# Patient Record
Sex: FEMALE | Race: BLACK OR AFRICAN AMERICAN | NOT HISPANIC OR LATINO | URBAN - METROPOLITAN AREA
[De-identification: names, ages, dates, MRNs, and addresses within clinical notes are randomized per-mention and may not be internally consistent; named-entity substitution may affect disease eponyms.]

---

## 2023-02-21 ENCOUNTER — OFFICE VISIT (OUTPATIENT)
Dept: PLASTIC SURGERY | Facility: CLINIC | Age: 23
End: 2023-02-21

## 2023-02-21 DIAGNOSIS — Z41.1 ENCOUNTER FOR COSMETIC PROCEDURE: Primary | ICD-10-CM

## 2023-02-21 NOTE — NURSING NOTE
Photodocumentation obtained.    Pt given contact info for Dr. Otto Lindo at [facility].    Dee Weiner RN  2/21/2023 1:50 PM

## 2023-02-21 NOTE — PROGRESS NOTES
Facial Plastic and Reconstructive Surgery Cosmetic Consultation    Referring Provider:   Referred Self, MD  No address on file    HPI:   I had the pleasure of seeing Elma Kumar today in clinic for consultation for surgical facial rejuvenation.     Elma Kumar is a 22 year old female. She had a sliding genioplasty in July 2022 with Dr. Anderson. This was done through an intraoral incision. She reports that she is happy with her profile, but feels her face is now too long and her lower lip is too small. She also feels the right chin is slightly rajan than the left. She denies any numbness or tingling. She has done some research and is interested in discussing a V to Y advancement for the lower lip. She has seen her original surgeon and another facial plastic surgeon about these concerns and was referred here. She is also scheduled to see Dr. Sands next month.       PE:  Alert and Oriented, Answering Questions Appropriately  Atraumatic, Normocephalic, Face Symmetric  Facial Nerve Intact and facial movement symmetric  She has appropriate chin projection on lateral view. Her upper and lower lip have a nice proportion. Intraorally, her incision is nicely healed without tethering.             IMPRESSION/PLAN: Elma Kumar is a 22 year old female who is s/p sliding genioplasty last summer who is presenting to discuss additional surgery. She is specifically bothered by feeling like her face is longer, there is more fullness on the right chin, and that her lower lip is smaller than before surgery. I discussed with her that I do not perform bony work on the chin and I gave her some names of [facility] colleagues who she could see. I was candid with her that I do not recommend surgery to the lower lip. We discussed that her lips have a nice proportion and I worry about the variables that surgery can bring including asymmetries, scarring, unacceptable cosmetic results, etc. I did tell her that I do not want to dismiss her  concerns, I just don't think I can meet her expectations. I also urged her to wait a full year before really considering any additional procedure to give her time to fully heal from her original surgery. She will let us know if there is anything additional we can do to help her out.       Photodocumentation was obtained.

## 2025-05-23 ENCOUNTER — HOSPITAL ENCOUNTER (EMERGENCY)
Facility: CLINIC | Age: 25
Discharge: HOME | End: 2025-05-23
Attending: EMERGENCY MEDICINE | Admitting: EMERGENCY MEDICINE
Payer: COMMERCIAL

## 2025-05-23 VITALS
TEMPERATURE: 98.3 F | RESPIRATION RATE: 18 BRPM | BODY MASS INDEX: 29.03 KG/M2 | HEART RATE: 82 BPM | DIASTOLIC BLOOD PRESSURE: 76 MMHG | WEIGHT: 185 LBS | SYSTOLIC BLOOD PRESSURE: 139 MMHG | HEIGHT: 67 IN | OXYGEN SATURATION: 100 %

## 2025-05-23 DIAGNOSIS — S05.02XA ABRASION OF LEFT CORNEA, INITIAL ENCOUNTER: ICD-10-CM

## 2025-05-23 PROCEDURE — 99283 EMERGENCY DEPT VISIT LOW MDM: CPT

## 2025-05-23 RX ORDER — TETRACAINE HYDROCHLORIDE 5 MG/ML
1-2 SOLUTION OPHTHALMIC ONCE
Status: DISCONTINUED | OUTPATIENT
Start: 2025-05-23 | End: 2025-05-23 | Stop reason: HOSPADM

## 2025-05-23 RX ORDER — ERYTHROMYCIN 5 MG/G
0.5 OINTMENT OPHTHALMIC 4 TIMES DAILY
Qty: 3.5 G | Refills: 0 | Status: SHIPPED | OUTPATIENT
Start: 2025-05-23 | End: 2025-05-28

## 2025-05-23 ASSESSMENT — COLUMBIA-SUICIDE SEVERITY RATING SCALE - C-SSRS
2. HAVE YOU ACTUALLY HAD ANY THOUGHTS OF KILLING YOURSELF IN THE PAST MONTH?: NO
6. HAVE YOU EVER DONE ANYTHING, STARTED TO DO ANYTHING, OR PREPARED TO DO ANYTHING TO END YOUR LIFE?: NO
1. IN THE PAST MONTH, HAVE YOU WISHED YOU WERE DEAD OR WISHED YOU COULD GO TO SLEEP AND NOT WAKE UP?: NO

## 2025-05-23 ASSESSMENT — VISUAL ACUITY: OU: 1

## 2025-05-23 ASSESSMENT — ACTIVITIES OF DAILY LIVING (ADL): ADLS_ACUITY_SCORE: 41

## 2025-05-24 NOTE — ED PROVIDER NOTES
"  Emergency Department Encounter     Evaluation Date & Time:   5/23/2025  8:34 PM    CHIEF COMPLAINT:  Eye Pain and Eye Problem      Triage Note:Pt c/o left eye pain and \"floaters\" in the left eye. Pt reports the pain starting yesterday after she had worn some false lashes. Today the pain felt worse. She is attempting to use OTC eye drops without relief      ED COURSE & MEDICAL DECISION MAKING:     Pt here with left eye pain, primarily when she blinks since yesterday around 1600 while driving. Pt was wearing false eyelashes when it happened, suspects she scratched eye.  Still having some pain, primarily with blinking. Using over the counter artificial tears/lubrication, which does help some.  Noticed some floaters today, so she came in.  External, gross eye exam unremarkable.  No HA, no discreet vision loss, no temporal mass/tenderness.  No indication for serum labs. Will administer tetracaine, check pressures and fluorescein exam.  Floaters likely incidental and unrelated, but pt encouraged ophtho follow up no matter what we find on exam.  Otherwise, very reassuring initial evaluation with nothing to suggest retinal detachment, temporal arteritis or orbital cellulitis.      ED Course as of 05/23/25 2311   Fri May 23, 2025   2038 I met the patient and performed my initial interview and exam.    2050 Performed woods lamp and tonopen procedures. Patient discharged with discussion of plan.         Medical Decision Making      Discharge. I prescribed additional prescription strength medication(s) as charted. See documentation for any additional details.    MIPS (CTPE, Dental pain, C[place], Sinusitis, Asthma/COPD, Head Trauma): Not Applicable    SEPSIS: None          MEDICATIONS GIVEN IN THE EMERGENCY DEPARTMENT:  Medications   fluorescein (FUL-SUSANNAH) ophthalmic strip 1 strip (has no administration in time range)   tetracaine (PONTOCAINE) 0.5 % ophthalmic solution 1-2 drop (has no administration in time range)       NEW " "PRESCRIPTIONS STARTED AT TODAY'S ED VISIT:  Discharge Medication List as of 5/23/2025  9:01 PM        START taking these medications    Details   erythromycin (ROMYCIN) 5 MG/GM ophthalmic ointment Place 0.5 inches Into the left eye 4 times daily for 5 days.Disp-3.5 g, [id number]             [facility]     Elma Kumar is a 24 year old female with a 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    Yesterday at 4 PM, the patient was driving when she felt a sudden stabbing pain in her left eye. She pulled over and could visualize no foreign body. She then drove home and noticed that the pain primarily flared when she blinked. She has since taken off the false lashes she wore and started to use eyedrops. But today the pain continued. This evening, she noticed floating black dots in her vision and thus decided to present to the ER for evaluation.     She has no loss of vision or double vision.     REVIEW OF SYSTEMS:  See [facility]      Medical History   History reviewed. No pertinent past medical history.    History reviewed. No pertinent surgical history.    History reviewed. No pertinent family history.         erythromycin (ROMYCIN) 5 MG/GM ophthalmic ointment        Physical Exam     Vitals:  /76   Pulse 82   Temp 98.3  F (36.8  C) (Temporal)   Resp 18   Ht 1.702 m (5' 7\")   Wt 83.9 kg (185 lb)   SpO2 100%   BMI 28.98 kg/m      PHYSICAL EXAM:   Physical Exam  Vitals and nursing note reviewed.   Constitutional:       General: She is not in acute distress.     Appearance: Normal appearance.   HENT:      Head: Normocephalic and atraumatic.      Nose: Nose normal.      Mouth/Throat:      Mouth: Mucous membranes are moist.   Eyes:      General: Lids are normal. Lids are everted, no foreign bodies appreciated. Vision grossly intact.      Extraocular Movements: Extraocular movements intact.      Pupils: Pupils are equal, round, and " reactive to light.      Left eye: Corneal abrasion and fluorescein uptake present.      Slit lamp exam:     Left eye: No foreign body.      Comments: Tonometry left eye: 19, 19  Fluorescein exam with uptake 6 o'clock position left eye.  Negative Sandro, no FB.   Cardiovascular:      Rate and Rhythm: Normal rate and regular rhythm.      Pulses: Normal pulses.           Radial pulses are 2+ on the right side and 2+ on the left side.        Dorsalis pedis pulses are 2+ on the right side and 2+ on the left side.   Pulmonary:      Effort: Pulmonary effort is normal.   Skin:     Findings: No rash.   Neurological:      General: No focal deficit present.      Mental Status: She is alert. Mental status is at baseline.      Comments: Fluent speech   Psychiatric:         Mood and Affect: Mood normal.         Behavior: Behavior normal.         Results     LAB:  All pertinent labs reviewed and interpreted  Labs Ordered and Resulted from Time of ED Arrival to Time of ED Departure - No data to display    RADIOLOGY:  No orders to display                ECG:  none    PROCEDURES:  Procedures:    PROCEDURE: Woods lamp Exam   INDICATIONS: Left eye pain   PROCEDURE PROVIDER: Dr Anton Akins   SITE: left eye   CONSENT:  The risks, benefits and alternatives for this procedure were explained to the patient and verbally accepted.     MEDICATION: fluorescein stain and tetracaine   EXAM FINDINGS: Left Eye: fluorescein uptake 6 o'clock position, negative Sandro, no FB   COMPLICATIONS: Patient tolerated procedure well, without complication       PROCEDURE: Intraocular Pressure Measurement   DEVICE USED: Tonopen   INDICATIONS: Left eye pain   PROCEDURE PROVIDER: Dr Anton Akins   SITE: Eyes   MEDICATION: 0.5% Tetracaine ophthalmic drops were applied to left eye   NOTE: Administered 2 drops of above solution, rapid anesthesia achieved. Using standard technique, performed Tonopen exam, which showed intraocular pressure(s) of;    19 mmHg in Left eye    COMPLICATIONS: Patient tolerated procedure well, without complication           FINAL IMPRESSION:    ICD-10-CM    1. Abrasion of left cornea, initial encounter  S05.02XA           0 minutes of critical care time      I, Regis Catalan, am serving as a scribe to document services personally performed by Dr. Wilmar Akins, based on my observations and the provider's statements to me. I, Wilmar Akins, DO attest that Regis Catalan is acting in a scribe capacity, has observed my performance of the services and has documented them in accordance with my direction.      Wilmar Akins DO  Emergency Medicine  [facility] EMERGENCY ROOM  5/23/2025  8:42 PM          Wilmar Akins MD  05/23/25 8432

## 2025-05-24 NOTE — ED TRIAGE NOTES
"Pt c/o left eye pain and \"floaters\" in the left eye. Pt reports the pain starting yesterday after she had worn some false lashes. Today the pain felt worse. She is attempting to use OTC eye drops without relief     Triage Assessment (Adult)       Row Name 05/23/25 2032          Triage Assessment    Airway WDL WDL        Respiratory WDL    Respiratory WDL WDL        Skin Circulation/Temperature WDL    Skin Circulation/Temperature WDL WDL        Cardiac WDL    Cardiac WDL WDL        Peripheral/Neurovascular WDL    Peripheral Neurovascular WDL WDL        Cognitive/Neuro/Behavioral WDL    Cognitive/Neuro/Behavioral WDL WDL                     "

## 2025-05-24 NOTE — DISCHARGE INSTRUCTIONS
Use antibiotic ointment as directed for 5 days.  Avoid putting anything in eyes or on lashes. Follow up with eye specialist.  Call for appointment.  Ok to alternate tylenol/ibuprofen for pain and use over the counter artifical tears throughout the day. This should improve over the next 2-3 days.